# Patient Record
(demographics unavailable — no encounter records)

---

## 2024-10-21 NOTE — HEALTH RISK ASSESSMENT
[Yes] : Yes [Monthly or less (1 pt)] : Monthly or less (1 point) [1 or 2 (0 pts)] : 1 or 2 (0 points) [Never (0 pts)] : Never (0 points) [No] : In the past 12 months have you used drugs other than those required for medical reasons? No [No falls in past year] : Patient reported no falls in the past year [0] : 2) Feeling down, depressed, or hopeless: Not at all (0) [PHQ-2 Negative - No further assessment needed] : PHQ-2 Negative - No further assessment needed [Patient/Caregiver not ready to engage] : , patient/caregiver not ready to engage [I will adhere to the patient's wishes.] : I will adhere to the patient's wishes. [Time Spent: ___ minutes] : Time Spent: [unfilled] minutes [Former] : Former [de-identified] : Active [Audit-CScore] : 1 [de-identified] : Average [facility] : [facility] [[id number]] : 0 [AdvancecareDate] : 10/23

## 2024-10-21 NOTE — REVIEW OF SYSTEMS
[Patient Intake Form Reviewed] : Patient intake form was reviewed [Fatigue] : fatigue [Joint Pain] : joint pain [Back Pain] : back pain [Negative] : Heme/Lymph [FreeTextEntry2] : Overweight [FreeTextEntry4] : Tinnitus [FreeTextEntry5] : HLD, HTN [FreeTextEntry8] : BPH w LUTS [FreeTextEntry9] : Aseptic Necrosis of Femoral Heads [FreeTextEntry1] : Low Vit. D, DM

## 2024-10-21 NOTE — PHYSICAL EXAM
[Well Nourished] : well nourished [Well Developed] : well developed [Well-Appearing] : well-appearing [Normal Sclera/Conjunctiva] : normal sclera/conjunctiva [PERRL] : pupils equal round and reactive to light [EOMI] : extraocular movements intact [Normal Outer Ear/Nose] : the outer ears and nose were normal in appearance [Normal Oropharynx] : the oropharynx was normal [No JVD] : no jugular venous distention [No Lymphadenopathy] : no lymphadenopathy [Supple] : supple [Thyroid Normal, No Nodules] : the thyroid was normal and there were no nodules present [No Respiratory Distress] : no respiratory distress  [No Accessory Muscle Use] : no accessory muscle use [Clear to Auscultation] : lungs were clear to auscultation bilaterally [Normal Rate] : normal rate  [Regular Rhythm] : with a regular rhythm [Normal S1, S2] : normal S1 and S2 [No Murmur] : no murmur heard [No Carotid Bruits] : no carotid bruits [No Abdominal Bruit] : a ~M bruit was not heard ~T in the abdomen [No Varicosities] : no varicosities [Pedal Pulses Present] : the pedal pulses are present [No Edema] : there was no peripheral edema [No Palpable Aorta] : no palpable aorta [No Extremity Clubbing/Cyanosis] : no extremity clubbing/cyanosis [Soft] : abdomen soft [Non Tender] : non-tender [Non-distended] : non-distended [No Masses] : no abdominal mass palpated [No HSM] : no HSM [Normal Bowel Sounds] : normal bowel sounds [No CVA Tenderness] : no CVA  tenderness [No Spinal Tenderness] : no spinal tenderness [No Joint Swelling] : no joint swelling [Grossly Normal Strength/Tone] : grossly normal strength/tone [No Rash] : no rash [Coordination Grossly Intact] : coordination grossly intact [No Focal Deficits] : no focal deficits [Normal Gait] : normal gait [Deep Tendon Reflexes (DTR)] : deep tendon reflexes were 2+ and symmetric [___/1] : [unfilled]/1   [___/3] : [unfilled]/3 [___/5] : [unfilled]/5 [___/4] : [unfilled]/4 [___/2] : [unfilled]/2 [___/8] : [unfilled]/8 [Normal] : Normal [Normal Affect] : the affect was normal [Normal Insight/Judgement] : insight and judgment were intact [Comprehensive Foot Exam Normal] : Right and left foot were examined and both feet are normal. No ulcers in either foot. Toes are normal and with full ROM.  Normal tactile sensation with monofilament testing throughout both feet [SlumsTotal] : 30

## 2024-10-21 NOTE — ASSESSMENT
[FreeTextEntry1] : HTN/Fatigue/Overweight/Borderline DM - Diet and Exercise. Low Vit D - Vit D. Covid Screening - (+) COVID Abs Prostate Screening - Last PSA = 0.60.  PSA Today = 0.69. Colon Cancer Screening - FOBT negative 5/23 Contact Dermatitis - Clobetasol Soln.  Medrol Dose Pk.   f/u appt in 6 months for repeat testing

## 2024-10-21 NOTE — COUNSELING
[Fall prevention counseling provided] : Fall prevention counseling provided [Adequate lighting] : Adequate lighting [No throw rugs] : No throw rugs [Use proper foot wear] : Use proper foot wear [Behavioral health counseling provided] : Behavioral health counseling provided [Sleep ___ hours/day] : Sleep [unfilled] hours/day [Engage in a relaxing activity] : Engage in a relaxing activity [Plan in advance] : Plan in advance [AUDIT-C Screening administered and reviewed] : AUDIT-C Screening administered and reviewed [Potential consequences of obesity discussed] : Potential consequences of obesity discussed [Benefits of weight loss discussed] : Benefits of weight loss discussed [Structured Weight Management Program suggested:] : Structured weight management program suggested [Encouraged to increase physical activity] : Encouraged to increase physical activity [Weigh Self Weekly] : weigh self weekly [Decrease Portions] : decrease portions [None] : None [Good understanding] : Patient has a good understanding of lifestyle changes and steps needed to achieve self management goal [FreeTextEntry2] : former smoker

## 2024-11-04 NOTE — HEALTH RISK ASSESSMENT
[Yes] : Yes [Monthly or less (1 pt)] : Monthly or less (1 point) [1 or 2 (0 pts)] : 1 or 2 (0 points) [Never (0 pts)] : Never (0 points) [No] : In the past 12 months have you used drugs other than those required for medical reasons? No [No falls in past year] : Patient reported no falls in the past year [0] : 2) Feeling down, depressed, or hopeless: Not at all (0) [PHQ-2 Negative - No further assessment needed] : PHQ-2 Negative - No further assessment needed [Patient/Caregiver not ready to engage] : , patient/caregiver not ready to engage [I will adhere to the patient's wishes.] : I will adhere to the patient's wishes. [Time Spent: ___ minutes] : Time Spent: [unfilled] minutes [Former] : Former [Audit-CScore] : 1 [de-identified] : Active [de-identified] : Average [facility] : [facility] [[id number]] : 0 [AdvancecareDate] : 10/23

## 2025-05-12 NOTE — HEALTH RISK ASSESSMENT
[Yes] : Yes [Monthly or less (1 pt)] : Monthly or less (1 point) [1 or 2 (0 pts)] : 1 or 2 (0 points) [Never (0 pts)] : Never (0 points) [No] : In the past 12 months have you used drugs other than those required for medical reasons? No [No falls in past year] : Patient reported no falls in the past year [0] : 2) Feeling down, depressed, or hopeless: Not at all (0) [PHQ-2 Negative - No further assessment needed] : PHQ-2 Negative - No further assessment needed [Former] : Former [Patient/Caregiver not ready to engage] : , patient/caregiver not ready to engage [I will adhere to the patient's wishes.] : I will adhere to the patient's wishes. [Time Spent: ___ minutes] : Time Spent: [unfilled] minutes [Audit-CScore] : 1 [de-identified] : Active [de-identified] : Average [[facility]] : [facility] [[id number]] : 0 [AdvancecareDate] : 10/23

## 2025-05-12 NOTE — PHYSICAL EXAM
[Well Nourished] : well nourished [Well Developed] : well developed [Well-Appearing] : well-appearing [Normal Sclera/Conjunctiva] : normal sclera/conjunctiva [PERRL] : pupils equal round and reactive to light [EOMI] : extraocular movements intact [Normal Outer Ear/Nose] : the outer ears and nose were normal in appearance [Normal Oropharynx] : the oropharynx was normal [No JVD] : no jugular venous distention [No Lymphadenopathy] : no lymphadenopathy [Supple] : supple [Thyroid Normal, No Nodules] : the thyroid was normal and there were no nodules present [No Respiratory Distress] : no respiratory distress  [No Accessory Muscle Use] : no accessory muscle use [Clear to Auscultation] : lungs were clear to auscultation bilaterally [Normal Rate] : normal rate  [Regular Rhythm] : with a regular rhythm [Normal S1, S2] : normal S1 and S2 [No Murmur] : no murmur heard [No Carotid Bruits] : no carotid bruits [No Abdominal Bruit] : a ~M bruit was not heard ~T in the abdomen [No Varicosities] : no varicosities [Pedal Pulses Present] : the pedal pulses are present [No Edema] : there was no peripheral edema [No Palpable Aorta] : no palpable aorta [No Extremity Clubbing/Cyanosis] : no extremity clubbing/cyanosis [Soft] : abdomen soft [Non Tender] : non-tender [Non-distended] : non-distended [No Masses] : no abdominal mass palpated [No HSM] : no HSM [Normal Bowel Sounds] : normal bowel sounds [No CVA Tenderness] : no CVA  tenderness [No Spinal Tenderness] : no spinal tenderness [No Joint Swelling] : no joint swelling [Grossly Normal Strength/Tone] : grossly normal strength/tone [No Rash] : no rash [Coordination Grossly Intact] : coordination grossly intact [No Focal Deficits] : no focal deficits [Normal Gait] : normal gait [Deep Tendon Reflexes (DTR)] : deep tendon reflexes were 2+ and symmetric [___/1] : [unfilled]/1   [___/3] : [unfilled]/3 [___/5] : [unfilled]/5 [___/4] : [unfilled]/4 [___/2] : [unfilled]/2 [___/8] : [unfilled]/8 [Normal] : Normal [Normal Affect] : the affect was normal [Normal Insight/Judgement] : insight and judgment were intact [Comprehensive Foot Exam Normal] : Right and left foot were examined and both feet are normal. No ulcers in either foot. Toes are normal and with full ROM.  Normal tactile sensation with monofilament testing throughout both feet [de-identified] : two skin lesions noted on left shoulder with mild erythema [SlumsTotal] : 30

## 2025-05-12 NOTE — PLAN
[FreeTextEntry1] : Patient is a 66 yo male, with PMH of borderline DM, HTN, vitamin D deficiency, and asthma, who presents due to a rash on his left shoulder. Patient states that the rash started about 1 month ago. Patient states that the rash is pruritic. He has an extensive history of allergic rhinitis related to pollen. He is currently going through allergy shots with an allergist. He states he started the shots about 3 months ago. He also mentioned that he got his COVID vaccine about 1 month ago before the rash started. Denies pain to the area of the rash. Denies SOB, chest pain, fever, chills, or abdominal pain.  -care plan reviewed -medications reviewed and renewed; Start Clobetasol Propionate cream for skin pruritus  -labs drawn today -RTC in 3 mo

## 2025-05-12 NOTE — HISTORY OF PRESENT ILLNESS
[FreeTextEntry8] : Patient is a 68 yo male, with PMH of borderline DM, HTN, vitamin D deficiency, and asthma, who presents due to a rash on his left shoulder. Patient states that the rash started about 1 month ago. Patient states that the rash is pruritic. He has an extensive history of allergic rhinitis related to pollen. He is currently going through allergy shots with an allergist. He states he started the shots about 3 months ago. He also mentioned that he got his COVID vaccine about 1 month ago before the rash started. Denies pain to the area of the rash. Denies SOB, chest pain, fever, chills, or abdominal pain.

## 2025-05-12 NOTE — REVIEW OF SYSTEMS
[Patient Intake Form Reviewed] : Patient intake form was reviewed [Fatigue] : fatigue [Joint Pain] : joint pain [Back Pain] : back pain [Itching] : itching [Skin Rash] : skin rash [Negative] : Genitourinary [Fever] : no fever [Chills] : no chills [Hot Flashes] : no hot flashes [Night Sweats] : no night sweats [Recent Change In Weight] : ~T no recent weight change [Mole Changes] : no mole changes [Nail Changes] : no nail changes [Hair Changes] : no hair changes [FreeTextEntry1] : Low Vit. D, DM

## 2025-05-12 NOTE — ASSESSMENT
[FreeTextEntry1] : Patient is a 68 yo male, with PMH of borderline DM, HTN, vitamin D deficiency, and asthma, who presents due to a rash on his left shoulder. Patient states that the rash started about 1 month ago. Patient states that the rash is pruritic. He has an extensive history of allergic rhinitis related to pollen. He is currently going through allergy shots with an allergist. He states he started the shots about 3 months ago. He also mentioned that he got his COVID vaccine about 1 month ago before the rash started. Denies pain to the area of the rash. Denies SOB, chest pain, fever, chills, or abdominal pain.

## 2025-07-21 NOTE — HEALTH RISK ASSESSMENT
[Yes] : Yes [Monthly or less (1 pt)] : Monthly or less (1 point) [1 or 2 (0 pts)] : 1 or 2 (0 points) [Never (0 pts)] : Never (0 points) [No] : In the past 12 months have you used drugs other than those required for medical reasons? No [No falls in past year] : Patient reported no falls in the past year [0] : 2) Feeling down, depressed, or hopeless: Not at all (0) [PHQ-2 Negative - No further assessment needed] : PHQ-2 Negative - No further assessment needed [Patient/Caregiver not ready to engage] : , patient/caregiver not ready to engage [I will adhere to the patient's wishes.] : I will adhere to the patient's wishes. [Time Spent: ___ minutes] : Time Spent: [unfilled] minutes [Former] : Former [Audit-CScore] : 1 [de-identified] : Active [de-identified] : Average [facility] : 8 [[id number]] : 0 [AdvancecareDate] : 10/25

## 2025-07-21 NOTE — ASSESSMENT
[FreeTextEntry1] : HTN/Fatigue/Overweight/Borderline DM - Diet and Exercise. Low Vit D - Vit D. Covid Screening - (+) COVID Abs Prostate Screening - Last PSA = 0.60.  PSA Today = 0.69. Colon Cancer Screening - FOBT negative 5/23 Contact Dermatitis - Clobetasol Soln.   Shingles/ Post hepatic neuralgia- Valtrex 1g TID X 10 days, followed by 1g QD for 60 days  f/u appt in 1 month